# Patient Record
(demographics unavailable — no encounter records)

---

## 2024-11-10 NOTE — ASSESSMENT
[FreeTextEntry1] : 30 year old female found to have stable Reactive Airway Disease, Elevated Hemoglobin A1c, Vitamin D Deficiency, Anemia, Allergic Rhinosinusitis, Lumbar Radiculopathy, Paresthesia, with the current prescription regimen as recommended, diet and lifestyle modifications, as counseled. Prior results reviewed, interpreted and discussed with the patient during today's examination, as appropriate. Follow up, treatment plan and tests, as ordered.   Patient was recommended to follow up with GYN for routine examination, PAP smear, reports will be provided, when ready.

## 2024-11-10 NOTE — HISTORY OF PRESENT ILLNESS
[de-identified] : 30 year old female patient with history of stable Reactive Airway Disease, Elevated Hemoglobin A1c, Vitamin D Deficiency, Anemia, Allergic Rhinosinusitis, Lumbar Radiculopathy, Paresthesia, history as stated, presented for an annual preventative examination.

## 2024-11-10 NOTE — HEALTH RISK ASSESSMENT
[FreeTextEntry1] : Check up\par   Dose Optimization/Non-renal Dose Adjustment [de-identified] : None Therapy Recommended - Alternative treatment [[id number]] : 0 [Change in mental status noted] : No change in mental status noted [Reports changes in hearing] : Reports no changes in hearing [Reports changes in vision] : Reports no changes in vision [Reports changes in dental health] : Reports no changes in dental health [HIVDate] : 05/19 [HIVComments] : Negative [HepatitisCDate] : 03/17 [HepatitisCComments] : Negative [AdvancecareDate] : 11/24

## 2025-06-27 NOTE — HEALTH RISK ASSESSMENT
[Good] : ~his/her~  mood as  good [No] : In the past 12 months have you used drugs other than those required for medical reasons? No [No falls in past year] : Patient reported no falls in the past year [0] : 2) Feeling down, depressed, or hopeless: Not at all (0) [Never] : Never [NO] : No [None] : None [Feels Safe at Home] : Feels safe at home [Fully functional (bathing, dressing, toileting, transferring, walking, feeding)] : Fully functional (bathing, dressing, toileting, transferring, walking, feeding) [Fully functional (using the telephone, shopping, preparing meals, housekeeping, doing laundry, using] : Fully functional and needs no help or supervision to perform IADLs (using the telephone, shopping, preparing meals, housekeeping, doing laundry, using transportation, managing medications and managing finances) [Smoke Detector] : smoke detector [Carbon Monoxide Detector] : carbon monoxide detector [Seat Belt] :  uses seat belt [Sunscreen] : uses sunscreen [With Patient/Caregiver] : , with patient/caregiver [FreeTextEntry1] : Check up\par   [de-identified] : None [[id number]] : 0 [Change in mental status noted] : No change in mental status noted [Reports changes in hearing] : Reports no changes in hearing [Reports changes in vision] : Reports no changes in vision [Reports changes in dental health] : Reports no changes in dental health [PapSmearComments] : As per GYN. [HIVDate] : 05/19 [HIVComments] : Negative [HepatitisCDate] : 03/17 [HepatitisCComments] : Negative [AdvancecareDate] : 06/25

## 2025-06-27 NOTE — HISTORY OF PRESENT ILLNESS
[de-identified] : 31 year old female patient with history of stable Reactive Airway Disease, Elevated Hemoglobin A1c, Vitamin D Deficiency, Anemia, Allergic Rhinosinusitis, Lumbar Radiculopathy, Paresthesia, history as stated, presented for an annual preventative examination.

## 2025-06-27 NOTE — PHYSICAL EXAM
[TextEntry] : CONSTITUTIONAL:  No acute distress, well developed and well appearing. EYES:  Normal sclera/conjunctiva, PERRLA, EOMI. ENT:  Normal outer ears/nose, normal oropharynx. NECK:  No JVD, supple, thyroid normal/no nodules, no lymphadenopathy. PULMONARY:  No respiratory distress, clear to auscultation, no accessory muscle use. CARDIAC:  Normal rate, regular rhythm, normal S1/S2, no murmur. VASCULAR:  No carotid bruits, no abdominal bruit, no varicosities, pedal pulses present, no edema, no extremity clubbing/cyanosis. ABDOMEN:  Normoactive bowel sounds, soft and nontender, no hepatosplenomegaly or masses appreciated. BACK:  No CVA tenderness, no spinal tenderness. MUSCULOSKELETAL:  No joint swelling, grossly normal strength/tone. SKIN:  No rash, normal turgor. NEUROLOGY:  Normal gait and coordination, no focal deficits.

## 2025-06-27 NOTE — ASSESSMENT
[FreeTextEntry1] : 27 year old female found to have stable Reactive Airway Disease, Elevated Hemoglobin A1c, Vitamin D Deficiency, Anemia, Allergic Rhinosinusitis, Lumbar Radiculopathy, Paresthesia,with the current prescription regimen as recommended, diet and life style modifications, as counseled. Prior results reviewed, interpreted and discussed with the patient during today's examination, as appropriate. Follow up, treatment plan and tests, as ordered.\par  \par  Patient was recommended to follow up with GYN for routine examination, PAP smear, reports will be provided, when ready.\par

## 2025-07-19 NOTE — ASSESSMENT
[Vaccines Reviewed] : Immunizations reviewed today. Please see immunization details in the vaccine log within the immunization flowsheet.  [FreeTextEntry1] : 31 year old female found to have stable Reactive Airway Disease, Elevated Hemoglobin A1c, Vitamin D Deficiency, Anemia, Allergic Rhinosinusitis, Lumbar Radiculopathy, Paresthesia, with the current prescription regimen as recommended, diet and life style modifications, as counseled. Prior results reviewed, interpreted and discussed with the patient during today's examination, as appropriate. Follow up, treatment plan and tests, as ordered.  Recent weight gain, dyspnea, bilateral peripheral edema, of unknown etiology, Labs, ENDO/CARD/PULM F/Us, for further assessment and possible intervention, as counseled.  Patient was recommended to follow up with GYN for routine examination, PAP smear, reports will be provided, when ready.

## 2025-07-19 NOTE — HEALTH RISK ASSESSMENT
[Good] : ~his/her~  mood as  good [No] : In the past 12 months have you used drugs other than those required for medical reasons? No [No falls in past year] : Patient reported no falls in the past year [0] : 2) Feeling down, depressed, or hopeless: Not at all (0) [Never] : Never [NO] : No [Patient declined PAP Smear] : Patient declined PAP Smear [None] : None [Feels Safe at Home] : Feels safe at home [Fully functional (bathing, dressing, toileting, transferring, walking, feeding)] : Fully functional (bathing, dressing, toileting, transferring, walking, feeding) [Fully functional (using the telephone, shopping, preparing meals, housekeeping, doing laundry, using] : Fully functional and needs no help or supervision to perform IADLs (using the telephone, shopping, preparing meals, housekeeping, doing laundry, using transportation, managing medications and managing finances) [Smoke Detector] : smoke detector [Carbon Monoxide Detector] : carbon monoxide detector [Seat Belt] :  uses seat belt [Sunscreen] : uses sunscreen [With Patient/Caregiver] : , with patient/caregiver [FreeTextEntry1] : Check up\par   [de-identified] : None [[id number]] : 0 [Change in mental status noted] : No change in mental status noted [Reports changes in hearing] : Reports no changes in hearing [Reports changes in vision] : Reports no changes in vision [Reports changes in dental health] : Reports no changes in dental health [PapSmearComments] : As per GYN. [HIVDate] : 05/19 [HIVComments] : Negative [HepatitisCDate] : 03/17 [HepatitisCComments] : Negative [AdvancecareDate] : 07/25

## 2025-07-19 NOTE — HEALTH RISK ASSESSMENT
[Good] : ~his/her~  mood as  good [No] : In the past 12 months have you used drugs other than those required for medical reasons? No [No falls in past year] : Patient reported no falls in the past year [0] : 2) Feeling down, depressed, or hopeless: Not at all (0) [Never] : Never [NO] : No [Patient declined PAP Smear] : Patient declined PAP Smear [None] : None [Feels Safe at Home] : Feels safe at home [Fully functional (bathing, dressing, toileting, transferring, walking, feeding)] : Fully functional (bathing, dressing, toileting, transferring, walking, feeding) [Fully functional (using the telephone, shopping, preparing meals, housekeeping, doing laundry, using] : Fully functional and needs no help or supervision to perform IADLs (using the telephone, shopping, preparing meals, housekeeping, doing laundry, using transportation, managing medications and managing finances) [Smoke Detector] : smoke detector [Carbon Monoxide Detector] : carbon monoxide detector [Seat Belt] :  uses seat belt [Sunscreen] : uses sunscreen [With Patient/Caregiver] : , with patient/caregiver [FreeTextEntry1] : Check up\par   [de-identified] : None [[id number]] : 0 [Change in mental status noted] : No change in mental status noted [Reports changes in hearing] : Reports no changes in hearing [Reports changes in vision] : Reports no changes in vision [Reports changes in dental health] : Reports no changes in dental health [PapSmearComments] : As per GYN. [HIVDate] : 05/19 [HIVComments] : Negative [HepatitisCDate] : 03/17 [HepatitisCComments] : Negative [AdvancecareDate] : 07/25

## 2025-07-19 NOTE — HISTORY OF PRESENT ILLNESS
[de-identified] : 31 year old female patient with history of stable Reactive Airway Disease, Elevated Hemoglobin A1c, Vitamin D Deficiency, Anemia, Allergic Rhinosinusitis, Lumbar Radiculopathy, Paresthesia, history as stated, presented for an annual preventative examination.

## 2025-07-19 NOTE — HISTORY OF PRESENT ILLNESS
[de-identified] : 31 year old female patient with history of stable Reactive Airway Disease, Elevated Hemoglobin A1c, Vitamin D Deficiency, Anemia, Allergic Rhinosinusitis, Lumbar Radiculopathy, Paresthesia, history as stated, presented for an annual preventative examination.

## 2025-07-19 NOTE — PHYSICAL EXAM
[TextEntry] : CONSTITUTIONAL:  No acute distress, well developed and well appearing. EYES:  Normal sclera/conjunctiva, PERRLA, EOMI. ENT:  Normal outer ears/nose, normal oropharynx. NECK:  No JVD, supple, thyroid normal/no nodules, no lymphadenopathy. PULMONARY:  No respiratory distress, clear to auscultation, no accessory muscle use. CARDIAC:  Normal rate, regular rhythm, normal S1/S2, no murmur. VASCULAR:  No carotid bruits, no abdominal bruit, no varicosities, pedal pulses present, B/L 1 + edema noted, no extremity clubbing/cyanosis. ABDOMEN:  Normoactive bowel sounds, soft and nontender, no hepatosplenomegaly or masses appreciated. BACK:  No CVA tenderness, no spinal tenderness. MUSCULOSKELETAL:  No joint swelling, grossly normal strength/tone. SKIN:  No rash, normal turgor. NEUROLOGY:  Normal gait and coordination, no focal deficits.

## 2025-07-19 NOTE — REVIEW OF SYSTEMS
[Negative] : Heme/Lymph [Shortness Of Breath] : shortness of breath [Recent Change In Weight] : ~T recent weight change